# Patient Record
Sex: MALE | Race: WHITE | NOT HISPANIC OR LATINO
[De-identification: names, ages, dates, MRNs, and addresses within clinical notes are randomized per-mention and may not be internally consistent; named-entity substitution may affect disease eponyms.]

---

## 2020-11-24 ENCOUNTER — TRANSCRIPTION ENCOUNTER (OUTPATIENT)
Age: 75
End: 2020-11-24

## 2021-09-21 ENCOUNTER — TRANSCRIPTION ENCOUNTER (OUTPATIENT)
Age: 76
End: 2021-09-21

## 2022-07-04 ENCOUNTER — NON-APPOINTMENT (OUTPATIENT)
Age: 77
End: 2022-07-04

## 2023-05-23 PROBLEM — Z00.00 ENCOUNTER FOR PREVENTIVE HEALTH EXAMINATION: Status: ACTIVE | Noted: 2023-05-23

## 2023-06-19 ENCOUNTER — TRANSCRIPTION ENCOUNTER (OUTPATIENT)
Age: 78
End: 2023-06-19

## 2023-06-19 ENCOUNTER — APPOINTMENT (OUTPATIENT)
Dept: UROLOGY | Facility: CLINIC | Age: 78
End: 2023-06-19
Payer: MEDICARE

## 2023-06-19 VITALS
DIASTOLIC BLOOD PRESSURE: 52 MMHG | SYSTOLIC BLOOD PRESSURE: 89 MMHG | HEART RATE: 65 BPM | HEIGHT: 70.5 IN | TEMPERATURE: 98 F | WEIGHT: 182 LBS | BODY MASS INDEX: 25.76 KG/M2 | OXYGEN SATURATION: 96 %

## 2023-06-19 VITALS — HEART RATE: 63 BPM | DIASTOLIC BLOOD PRESSURE: 56 MMHG | SYSTOLIC BLOOD PRESSURE: 100 MMHG

## 2023-06-19 DIAGNOSIS — N40.0 BENIGN PROSTATIC HYPERPLASIA WITHOUT LOWER URINARY TRACT SYMPMS: ICD-10-CM

## 2023-06-19 DIAGNOSIS — Z80.1 FAMILY HISTORY OF MALIGNANT NEOPLASM OF TRACHEA, BRONCHUS AND LUNG: ICD-10-CM

## 2023-06-19 DIAGNOSIS — Z86.79 PERSONAL HISTORY OF OTHER DISEASES OF THE CIRCULATORY SYSTEM: ICD-10-CM

## 2023-06-19 DIAGNOSIS — Z86.39 PERSONAL HISTORY OF OTHER ENDOCRINE, NUTRITIONAL AND METABOLIC DISEASE: ICD-10-CM

## 2023-06-19 PROCEDURE — 99204 OFFICE O/P NEW MOD 45 MIN: CPT

## 2023-06-20 NOTE — ASSESSMENT
[FreeTextEntry1] : MEENU JIANG, is a 77 year old  with PMH of HTN and LBBB who was referred for fusion biopsy. His most recent MRI (03/27/2023) shows 133 ml prostate with PIRADS 5 lesion measuring 1.5 cm in the right posterolateral apex peripheral zone. PSAD 0.04. He has no hx of prostate biopsy and no FH of prostate cancer. \par \par patient and wife have been explained the RBA of prostate cancer screening and the size of the prostate which if diagnosed would require ADT prior to radiation if there is acutally cancer may adversely affect is QOL.  We will trend PSA and use 10 as a threshold to consider repeat MRI if sooner than 1 year.\par \par Elevated PSA\par -follow up in 6 months with PSA\par -repeat prostate MRI in 1 year (June 2024)\par -can consider prostate Biopsy if additional or suspicious lesions after prostate MRI\par  \par Thank you very much for allowing me to assist in the care of this patient. Should you have any additional questions or concerns please do not hesitate to contact me.\par \par \par Sincerely,\par \par \par Daren Morales D.O.\par Professor of Urology and Radiology\par  of Urology at \par System Director for Prostate Cancer\par [address] [facility], [address]\par Phone: [phone]\par

## 2023-06-20 NOTE — HISTORY OF PRESENT ILLNESS
[FreeTextEntry1] : Dear Dr. LEVENTHAL, IRWIN\par \par Thank you so much for the referral to help care for your patient.\par \par Chief Complaint: Prostate Cancer Consultation\par Date of first visit: 06/19/2023\par \par MEENU JIANG, is a 77 year old  with PMH of HTN and LBBB who presents for Prostate Cancer Consultation. Both him and his wife are avid skiers. He has been following with Dr. Leventhal who referred him for fusion Biopsy due to recent MRI imaging (see below). He is currently not on any urinary medications, no family history of prostate cancer and has never had a prostate biopsy. \par \par LUTS- he has mild LUTS (urgency and nocturia 2-3x) and is currently not on medication. Only bothersome symptom is nocturia 2-3x, but is able to go back to sleep. Denies any fevers, chills, hematuria, dysuria or recent UTI.\par  \par PSA Hx\par 6.39 3/27/23   PSAD 0.04\par 6.6 12/5/22\par 5.6 5/10/22\par \par MRI Hx:\par MRI read 03/27/2023. 133 ml prostate with PIRADS 5 lesion measuring 1.5 cm in the right posterolateral apex peripheral zone. No LAD No EPE, No Bony Lesions.  The images have been reviewed and clinical implications discussed with the patient.\par \par MRI at [place] on 5/11/2022. 147 cc prostate with no suspicious lesions, PIRADS 2.  No LAD No EPE, No Bony Lesions.  The images have been reviewed and clinical implications discussed with the patient.\par \par 06/19/2023\par IPSS 9    QOL 3\par HAKAN 16\par \par Prostate cancer screening: the patient and I spoke at length about prostate cancer screening, its risks and its benefits. The patient has (Age, PSA) 2 risk factors for prostate cancer.  He understands that many men with prostate cancer will die with the disease rather than of it and we also discussed the results large multi-center American and  prostate cancer screening trials. He also understands that PSA in and of itself does not diagnose prostate cancer but only assesses risk to a certain degree. The patient understands that to definitively screen for prostate cancer, a biopsy is required and this procedure has risks, including bleeding, infection, ED and urinary retention. The patient opted to hold on a biopsy for now given that the MRI demonstrated no EPE or LAD.\par  \par The patient denies fevers, chills, nausea and or vomiting and no unexplained weight loss.\par \par All pertinent laboratory, films and physician notes were reviewed.  Questionnaire results were discussed with patient.\par \par I spent 31 minutes of non-face to face time reviewing the patient's records, chart, uploading processing MR images, transferring MR images from PACS to an independent workstation, reviewing images on independent workstation, speaking with their physician and planning their prostate biopsy and preparation of an upcoming visit for 06/19/2023 .  The imaging and planning was highly complex and took this entire time. \par \par \par

## 2023-09-21 ENCOUNTER — NON-APPOINTMENT (OUTPATIENT)
Age: 78
End: 2023-09-21

## 2023-09-21 ENCOUNTER — APPOINTMENT (OUTPATIENT)
Dept: INFECTIOUS DISEASE | Facility: CLINIC | Age: 78
End: 2023-09-21
Payer: MEDICARE

## 2023-09-21 VITALS — TEMPERATURE: 97.9 F | OXYGEN SATURATION: 98 % | HEART RATE: 52 BPM

## 2023-09-21 PROCEDURE — 99214 OFFICE O/P EST MOD 30 MIN: CPT

## 2023-09-21 RX ORDER — DAPTOMYCIN 500 MG/20ML
500 INJECTION, POWDER, LYOPHILIZED, FOR SUSPENSION INTRAVENOUS
Refills: 0 | Status: ACTIVE | COMMUNITY
Start: 2023-09-21

## 2023-10-05 ENCOUNTER — NON-APPOINTMENT (OUTPATIENT)
Age: 78
End: 2023-10-05

## 2023-10-12 ENCOUNTER — APPOINTMENT (OUTPATIENT)
Dept: INFECTIOUS DISEASE | Facility: CLINIC | Age: 78
End: 2023-10-12
Payer: MEDICARE

## 2023-10-12 ENCOUNTER — NON-APPOINTMENT (OUTPATIENT)
Age: 78
End: 2023-10-12

## 2023-10-12 VITALS
SYSTOLIC BLOOD PRESSURE: 98 MMHG | DIASTOLIC BLOOD PRESSURE: 54 MMHG | OXYGEN SATURATION: 97 % | HEART RATE: 79 BPM | TEMPERATURE: 97.9 F

## 2023-10-12 DIAGNOSIS — D69.6 THROMBOCYTOPENIA, UNSPECIFIED: ICD-10-CM

## 2023-10-12 DIAGNOSIS — S92.403A DISPLACED UNSPECIFIED FRACTURE OF UNSPECIFIED GREAT TOE, INITIAL ENCOUNTER FOR CLOSED FRACTURE: ICD-10-CM

## 2023-10-12 DIAGNOSIS — M86.9 OSTEOMYELITIS, UNSPECIFIED: ICD-10-CM

## 2023-10-12 PROCEDURE — 99214 OFFICE O/P EST MOD 30 MIN: CPT

## 2023-10-12 RX ORDER — CEFEPIME HYDROCHLORIDE 2 G/1
2 INJECTION, POWDER, FOR SOLUTION INTRAMUSCULAR; INTRAVENOUS
Refills: 0 | Status: DISCONTINUED | COMMUNITY
Start: 2023-09-21 | End: 2023-10-12

## 2023-11-27 ENCOUNTER — APPOINTMENT (OUTPATIENT)
Dept: UROLOGY | Facility: CLINIC | Age: 78
End: 2023-11-27
Payer: MEDICARE

## 2023-11-27 VITALS
DIASTOLIC BLOOD PRESSURE: 78 MMHG | SYSTOLIC BLOOD PRESSURE: 122 MMHG | OXYGEN SATURATION: 98 % | TEMPERATURE: 98.7 F | HEART RATE: 88 BPM

## 2023-11-27 DIAGNOSIS — N13.8 BENIGN PROSTATIC HYPERPLASIA WITH LOWER URINARY TRACT SYMPMS: ICD-10-CM

## 2023-11-27 DIAGNOSIS — N40.1 BENIGN PROSTATIC HYPERPLASIA WITH LOWER URINARY TRACT SYMPMS: ICD-10-CM

## 2023-11-27 DIAGNOSIS — R97.20 ELEVATED PROSTATE, SPECIFIC ANTIGEN [PSA]: ICD-10-CM

## 2023-11-27 PROCEDURE — 99213 OFFICE O/P EST LOW 20 MIN: CPT

## 2023-11-27 RX ORDER — ALFUZOSIN HYDROCHLORIDE 10 MG/1
10 TABLET, EXTENDED RELEASE ORAL
Qty: 90 | Refills: 3 | Status: ACTIVE | COMMUNITY
Start: 2023-11-27 | End: 1900-01-01

## 2024-02-27 RX ORDER — ALFUZOSIN HYDROCHLORIDE 10 MG/1
10 TABLET, EXTENDED RELEASE ORAL
Qty: 90 | Refills: 2 | Status: ACTIVE | COMMUNITY
Start: 2024-02-27 | End: 1900-01-01

## 2024-03-29 RX ORDER — ALFUZOSIN HYDROCHLORIDE 10 MG/1
10 TABLET, EXTENDED RELEASE ORAL
Qty: 90 | Refills: 2 | Status: ACTIVE | COMMUNITY
Start: 2024-03-29 | End: 1900-01-01

## 2024-05-21 ENCOUNTER — OFFICE (OUTPATIENT)
Dept: URBAN - METROPOLITAN AREA CLINIC 38 | Facility: CLINIC | Age: 79
Setting detail: OPHTHALMOLOGY
End: 2024-05-21
Payer: MEDICARE

## 2024-05-21 DIAGNOSIS — H01.001: ICD-10-CM

## 2024-05-21 DIAGNOSIS — H01.004: ICD-10-CM

## 2024-05-21 DIAGNOSIS — H11.31: ICD-10-CM

## 2024-05-21 PROBLEM — H25.13 CATARACT; BOTH EYES: Status: ACTIVE | Noted: 2024-05-21

## 2024-05-21 PROCEDURE — 99203 OFFICE O/P NEW LOW 30 MIN: CPT | Performed by: OPHTHALMOLOGY

## 2024-05-21 ASSESSMENT — CONFRONTATIONAL VISUAL FIELD TEST (CVF)
OS_FINDINGS: FULL
OD_FINDINGS: FULL

## 2024-05-21 ASSESSMENT — LID EXAM ASSESSMENTS
OD_BLEPHARITIS: RUL T 1+
OS_BLEPHARITIS: LUL T 1+

## 2024-05-23 ENCOUNTER — APPOINTMENT (OUTPATIENT)
Dept: MRI IMAGING | Facility: CLINIC | Age: 79
End: 2024-05-23

## 2024-06-03 ENCOUNTER — NON-APPOINTMENT (OUTPATIENT)
Age: 79
End: 2024-06-03

## 2024-06-11 ENCOUNTER — TRANSCRIPTION ENCOUNTER (OUTPATIENT)
Age: 79
End: 2024-06-11

## 2024-06-24 ENCOUNTER — APPOINTMENT (OUTPATIENT)
Dept: UROLOGY | Facility: CLINIC | Age: 79
End: 2024-06-24

## 2024-07-25 ENCOUNTER — RESULT REVIEW (OUTPATIENT)
Age: 79
End: 2024-07-25

## 2024-07-25 ENCOUNTER — APPOINTMENT (OUTPATIENT)
Dept: MRI IMAGING | Facility: CLINIC | Age: 79
End: 2024-07-25
Payer: MEDICARE

## 2024-07-25 PROCEDURE — 76498P: CUSTOM | Mod: 26,MH

## 2024-07-25 PROCEDURE — 72197 MRI PELVIS W/O & W/DYE: CPT | Mod: 26,MH

## 2024-07-31 ENCOUNTER — TRANSCRIPTION ENCOUNTER (OUTPATIENT)
Age: 79
End: 2024-07-31

## 2024-08-21 ENCOUNTER — APPOINTMENT (OUTPATIENT)
Dept: UROLOGY | Facility: CLINIC | Age: 79
End: 2024-08-21
Payer: MEDICARE

## 2024-08-21 DIAGNOSIS — N13.8 BENIGN PROSTATIC HYPERPLASIA WITH LOWER URINARY TRACT SYMPMS: ICD-10-CM

## 2024-08-21 DIAGNOSIS — N40.1 BENIGN PROSTATIC HYPERPLASIA WITH LOWER URINARY TRACT SYMPMS: ICD-10-CM

## 2024-08-21 PROCEDURE — 99213 OFFICE O/P EST LOW 20 MIN: CPT

## 2024-08-21 RX ORDER — FAMOTIDINE 10 MG/1
TABLET, FILM COATED ORAL
Refills: 0 | Status: ACTIVE | COMMUNITY

## 2024-08-21 RX ORDER — INDOMETHACIN 50 MG/1
CAPSULE ORAL
Refills: 0 | Status: ACTIVE | COMMUNITY

## 2024-08-21 RX ORDER — DOCUSATE SODIUM 100 MG/1
100 CAPSULE ORAL
Refills: 0 | Status: ACTIVE | COMMUNITY

## 2024-08-21 RX ORDER — SILDENAFIL 20 MG/1
TABLET ORAL
Refills: 0 | Status: ACTIVE | COMMUNITY

## 2024-08-21 RX ORDER — SIMVASTATIN 80 MG/1
TABLET, FILM COATED ORAL
Refills: 0 | Status: ACTIVE | COMMUNITY

## 2024-08-21 RX ORDER — CARVEDILOL 25 MG/1
25 TABLET, FILM COATED ORAL
Refills: 0 | Status: ACTIVE | COMMUNITY

## 2024-08-21 NOTE — HISTORY OF PRESENT ILLNESS
[FreeTextEntry1] : Chief Complaint: Prostate Cancer Consultation  Date of first visit: 06/19/2023  MEENU JIANG, is a 78 year old  with PMH of HTN and LBBB who presents for Prostate Cancer Consultation. Both him and his wife are avid skiers. He has been following with Dr. Leventhal who referred him for fusion Biopsy due to recent MRI imaging (see below). He is currently not on any urinary medications, no family history of prostate cancer and has never had a prostate biopsy.  LUTS- he has mild LUTS (urgency and nocturia 2-3x) and is currently not on medication. Only bothersome symptom is nocturia 2-3x, but is able to go back to sleep. Denies any fevers, chills, hematuria, dysuria or recent UTI.  Now notes worsening LUTS in interval. States that mostly at night when he experiences symptoms. Notes that small volume voids. States that in ER he did require straight catheterization (~800cc), but did not require indwelling. States that he also had an episode of retention after long flight to [place].  He recently had an accident (9/2/23) with left toe laceration to his big toe, placed on antibiotics c/b cellulitis, admitted to [facility] for IV Abx (3-days), discharged w/ PICC and Cefepime and Daptomycin. On interval checkup found to be pan cytopenic scheduled for BM biopsy---> diagnosed with MDS  [place] [facility] [place]   PSA Hx 7.7  04/24/2024 7.49 11/21/23 6.39 3/27/23 PSAD 0.04 6.6 12/5/22 5.6 5/10/22  MRI Hx: MRI at [facility] on 07/25/2024.  159 cc prostate with PIRADS 4 lesion #1 measuring 11 x 8 x 7 mm in the Right, posterior lateral (PZpl), apex, peripheral zone. Lesion #2 measuring 8 x 6 x 8 mm in the Right, posterior lateral (PZpl), midgland, peripheral zone. No LAD No EPE, No Bony Lesions.  The images have been reviewed and clinical implications discussed with the patient.  MRI at [place] on 05/14/2023. 133 cc prostate with PIRADS 5 lesion #1 measuring 1.5 mm in the Right, posterolateral apex peripheral zone. No LAD No EPE, No Bony Lesions. The images have been reviewed and clinical implications discussed with the patient.  MRI read 03/27/2023. 133 ml prostate with PIRADS 5 lesion measuring 1.5 cm in the right posterolateral apex peripheral zone. No LAD No EPE, No Bony Lesions. The images have been reviewed and clinical implications discussed with the patient.  MRI at [place] on 5/11/2022. 147 cc prostate with no suspicious lesions, PIRADS 2. No LAD No EPE, No Bony Lesions. The images have been reviewed and clinical implications discussed with the patient.  08/21/2024 IPSS 12      QOL 3 HAKAN  17 PVR: 60cc  11/27/2023 IPSS 18 QOL 3 HAKAN 17 PVR 7cc (11/27/23)  06/19/2023 IPSS 9 QOL 3 HAKAN 16  Prostate cancer screening: the patient and I spoke at length about prostate cancer screening, its risks and its benefits. The patient has (Age, PSA) 2 risk factors for prostate cancer. He understands that many men with prostate cancer will die with the disease rather than of it and we also discussed the results large multi-center American and  prostate cancer screening trials. He also understands that PSA in and of itself does not diagnose prostate cancer but only assesses risk to a certain degree. The patient understands that to definitively screen for prostate cancer, a biopsy is required and this procedure has risks, including bleeding, infection, ED and urinary retention. The patient opted to hold on a biopsy for now given that the MRI demonstrated no EPE or LAD.  The patient denies fevers, chills, nausea and or vomiting and no unexplained weight loss.  All pertinent laboratory, films and physician notes were reviewed. Questionnaire results were discussed with patient.

## 2024-08-21 NOTE — HISTORY OF PRESENT ILLNESS
[FreeTextEntry1] : Chief Complaint: Prostate Cancer Consultation  Date of first visit: 06/19/2023  MEENU JIANG, is a 78 year old  with PMH of HTN and LBBB who presents for Prostate Cancer Consultation. Both him and his wife are avid skiers. He has been following with Dr. Leventhal who referred him for fusion Biopsy due to recent MRI imaging (see below). He is currently not on any urinary medications, no family history of prostate cancer and has never had a prostate biopsy.  LUTS- he has mild LUTS (urgency and nocturia 2-3x) and is currently not on medication. Only bothersome symptom is nocturia 2-3x, but is able to go back to sleep. Denies any fevers, chills, hematuria, dysuria or recent UTI.  Now notes worsening LUTS in interval. States that mostly at night when he experiences symptoms. Notes that small volume voids. States that in ER he did require straight catheterization (~800cc), but did not require indwelling. States that he also had an episode of retention after long flight to [place].  He recently had an accident (9/2/23) with left toe laceration to his big toe, placed on antibiotics c/b cellulitis, admitted to [facility] for IV Abx (3-days), discharged w/ PICC and Cefepime and Daptomycin. On interval checkup found to be pan cytopenic scheduled for BM biopsy---> diagnosed with MDS  Patient on [place] in [facility] [place]DS.   PSA Hx 7.7  04/24/2024 7.49 11/21/23 6.39 3/27/23 PSAD 0.04 6.6 12/5/22 5.6 5/10/22  MRI Hx: MRI at [facility] on 07/25/2024.  159 cc prostate with PIRADS 4 lesion #1 measuring 11 x 8 x 7 mm in the Right, posterior lateral (PZpl), apex, peripheral zone. Lesion #2 measuring 8 x 6 x 8 mm in the Right, posterior lateral (PZpl), midgland, peripheral zone. No LAD No EPE, No Bony Lesions.  The images have been reviewed and clinical implications discussed with the patient.  MRI at [place] on 05/14/2023. 133 cc prostate with PIRADS 5 lesion #1 measuring 1.5 mm in the Right, posterolateral apex peripheral zone. No LAD No EPE, No Bony Lesions. The images have been reviewed and clinical implications discussed with the patient.  MRI read 03/27/2023. 133 ml prostate with PIRADS 5 lesion measuring 1.5 cm in the right posterolateral apex peripheral zone. No LAD No EPE, No Bony Lesions. The images have been reviewed and clinical implications discussed with the patient.  MRI at [place] on 5/11/2022. 147 cc prostate with no suspicious lesions, PIRADS 2. No LAD No EPE, No Bony Lesions. The images have been reviewed and clinical implications discussed with the patient.  08/21/2024 IPSS 12      QOL 3 HAKAN  17 PVR: 60cc  11/27/2023 IPSS 18 QOL 3 AHKAN 17 PVR 7cc (11/27/23)  06/19/2023 IPSS 9 QOL 3 HAKAN 16  Prostate cancer screening: the patient and I spoke at length about prostate cancer screening, its risks and its benefits. The patient has (Age, PSA) 2 risk factors for prostate cancer. He understands that many men with prostate cancer will die with the disease rather than of it and we also discussed the results large multi-center American and  prostate cancer screening trials. He also understands that PSA in and of itself does not diagnose prostate cancer but only assesses risk to a certain degree. The patient understands that to definitively screen for prostate cancer, a biopsy is required and this procedure has risks, including bleeding, infection, ED and urinary retention. The patient opted to hold on a biopsy for now given that the MRI demonstrated no EPE or LAD.  The patient denies fevers, chills, nausea and or vomiting and no unexplained weight loss.  All pertinent laboratory, films and physician notes were reviewed. Questionnaire results were discussed with patient.

## 2024-08-21 NOTE — HISTORY OF PRESENT ILLNESS
[FreeTextEntry1] : Chief Complaint: Prostate Cancer Consultation  Date of first visit: 06/19/2023  MEENU JIANG, is a 78 year old  with PMH of HTN and LBBB who presents for Prostate Cancer Consultation. Both him and his wife are avid skiers. He has been following with Dr. Leventhal who referred him for fusion Biopsy due to recent MRI imaging (see below). He is currently not on any urinary medications, no family history of prostate cancer and has never had a prostate biopsy.  LUTS- he has mild LUTS (urgency and nocturia 2-3x) and is currently not on medication. Only bothersome symptom is nocturia 2-3x, but is able to go back to sleep. Denies any fevers, chills, hematuria, dysuria or recent UTI.  Now notes worsening LUTS in interval. States that mostly at night when he experiences symptoms. Notes that small volume voids. States that in ER he did require straight catheterization (~800cc), but did not require indwelling. States that he also had an episode of retention after long flight to [place].  He recently had an accident (9/2/23) with left toe laceration to his big toe, placed on antibiotics c/b cellulitis, admitted to [facility] for IV Abx (3-days), discharged w/ PICC and Cefepime and Daptomycin. On interval checkup found to be pan cytopenic scheduled for BM biopsy---> diagnosed with MDS  [place] [facility]   PSA Hx 7.7  04/24/2024 7.49 11/21/23 6.39 3/27/23 PSAD 0.04 6.6 12/5/22 5.6 5/10/22  MRI Hx: MRI at [facility] on 07/25/2024.  159 cc prostate with PIRADS 4 lesion #1 measuring 11 x 8 x 7 mm in the Right, posterior lateral (PZpl), apex, peripheral zone. Lesion #2 measuring 8 x 6 x 8 mm in the Right, posterior lateral (PZpl), midgland, peripheral zone. No LAD No EPE, No Bony Lesions.  The images have been reviewed and clinical implications discussed with the patient.  MRI at [place] on 05/14/2023. 133 cc prostate with PIRADS 5 lesion #1 measuring 1.5 mm in the Right, posterolateral apex peripheral zone. No LAD No EPE, No Bony Lesions. The images have been reviewed and clinical implications discussed with the patient.  MRI read 03/27/2023. 133 ml prostate with PIRADS 5 lesion measuring 1.5 cm in the right posterolateral apex peripheral zone. No LAD No EPE, No Bony Lesions. The images have been reviewed and clinical implications discussed with the patient.  MRI at [place] on 5/11/2022. 147 cc prostate with no suspicious lesions, PIRADS 2. No LAD No EPE, No Bony Lesions. The images have been reviewed and clinical implications discussed with the patient.  08/21/2024 IPSS 12      QOL 3 HAKAN  17 PVR: 60cc  11/27/2023 IPSS 18 QOL 3 HAKAN 17 PVR 7cc (11/27/23)  06/19/2023 IPSS 9 QOL 3 HAKAN 16  Prostate cancer screening: the patient and I spoke at length about prostate cancer screening, its risks and its benefits. The patient has (Age, PSA) 2 risk factors for prostate cancer. He understands that many men with prostate cancer will die with the disease rather than of it and we also discussed the results large multi-center American and  prostate cancer screening trials. He also understands that PSA in and of itself does not diagnose prostate cancer but only assesses risk to a certain degree. The patient understands that to definitively screen for prostate cancer, a biopsy is required and this procedure has risks, including bleeding, infection, ED and urinary retention. The patient opted to hold on a biopsy for now given that the MRI demonstrated no EPE or LAD.  The patient denies fevers, chills, nausea and or vomiting and no unexplained weight loss.  All pertinent laboratory, films and physician notes were reviewed. Questionnaire results were discussed with patient.

## 2024-08-21 NOTE — PHYSICAL EXAM
[General Appearance - In No Acute Distress] : no acute distress [] : no respiratory distress [Abdomen Soft] : soft [Abdomen Tenderness] : non-tender [Costovertebral Angle Tenderness] : no ~M costovertebral angle tenderness [Oriented To Time, Place, And Person] : oriented to person, place, and time

## 2024-08-21 NOTE — ASSESSMENT
[FreeTextEntry1] : 78 year old  with PMH of HTN and LBBB who was referred for fusion biopsy. His most recent MRI (03/27/2023) shows 133 ml prostate with PIRADS 5 lesion measuring 1.5 cm in the right posterolateral apex peripheral zone. PSAD 0.04. He has no hx of prostate biopsy and no FH of prostate cancer.  patient and wife have been explained the RBA of prostate cancer screening and the size of the prostate which if diagnosed would require ADT prior to radiation if there is actually cancer may adversely affect is QOL. We will trend PSA and use 10 as a threshold to consider repeat MRI if sooner than 1 year.  LUTS/BPH - IPSS 18---> 12-15, patient happy with improvement  - has been taking Uroxatral; no side effects  - PVR 8/21/24 50 cc  - If worsening or no relief, can consider PAE in the future - continue alfuzosin   Elevated PSA - Most recent 7.49, PSAD still wnl; will plan to monitor and repeat MRI in 1 year (July 2024) - follow up in 6 months with PSA -can consider prostate Biopsy if additional or suspicious lesions after prostate MRI  Being [place] in [facility] [place], plan for bone marrow transplant 10-11/2024  Thank you very much for allowing me to assist in the care of this patient. Please do not hesitate to contact me with any additional questions or concerns.      Sincerely,     Daren Morales D.O. Professor of Urology and Radiology  of Urology at [facility] Director for Prostate Cancer [address] [facility], [facility] Phone: [phone]

## 2024-08-21 NOTE — ASSESSMENT
[FreeTextEntry1] : 78 year old  with PMH of HTN and LBBB who was referred for fusion biopsy. His most recent MRI (03/27/2023) shows 133 ml prostate with PIRADS 5 lesion measuring 1.5 cm in the right posterolateral apex peripheral zone. PSAD 0.04. He has no hx of prostate biopsy and no FH of prostate cancer.  patient and wife have been explained the RBA of prostate cancer screening and the size of the prostate which if diagnosed would require ADT prior to radiation if there is actually cancer may adversely affect is QOL. We will trend PSA and use 10 as a threshold to consider repeat MRI if sooner than 1 year.  LUTS/BPH - IPSS 18---> 12-15, patient happy with improvement  - has been taking Uroxatral; no side effects  - PVR 8/21/24 50 cc  - If worsening or no relief, can consider PAE in the future - continue alfuzosin   Elevated PSA - Most recent 7.49, PSAD still wnl; will plan to monitor and repeat MRI in 1 year (July 2024) - follow up in 6 months with PSA -can consider prostate Biopsy if additional or suspicious lesions after prostate MRI  Being [place] [facility] [place], plan for bone marrow transplant 10-11/2024  Thank you very much for allowing me to assist in the care of this patient. Please do not hesitate to contact me with any additional questions or concerns.      Sincerely,     Daren Morales D.O. Professor of Urology and Radiology  of Urology at [facility] Director for Prostate Cancer [address] [facility], [facility] Phone: [phone]

## 2024-08-21 NOTE — ADDENDUM
[FreeTextEntry1] :   I, Dr. Morales, personally performed the evaluation and management (E/M) services for this established patient who presents today with (a) new problem(s)/exacerbation of (an) existing condition(s).  That E/M includes conducting the examination, assessing all new/exacerbated conditions, and establishing a new plan of care.  Today, my ACP, Vaishnavi Florez, ANP-BC, was here to observe my evaluation and management services for this new problem/exacerbated condition to be followed going forward.

## 2024-08-21 NOTE — ASSESSMENT
[FreeTextEntry1] : 78 year old  with PMH of HTN and LBBB who was referred for fusion biopsy. His most recent MRI (03/27/2023) shows 133 ml prostate with PIRADS 5 lesion measuring 1.5 cm in the right posterolateral apex peripheral zone. PSAD 0.04. He has no hx of prostate biopsy and no FH of prostate cancer.  patient and wife have been explained the RBA of prostate cancer screening and the size of the prostate which if diagnosed would require ADT prior to radiation if there is actually cancer may adversely affect is QOL. We will trend PSA and use 10 as a threshold to consider repeat MRI if sooner than 1 year.  LUTS/BPH - IPSS 18---> 12-15, patient happy with improvement  - has been taking Uroxatral; no side effects  - PVR 8/21/24 50 cc  - If worsening or no relief, can consider PAE in the future - continue alfuzosin   Elevated PSA - Most recent 7.49, PSAD still wnl; will plan to monitor and repeat MRI in 1 year (July 2024) - follow up in 6 months with PSA -can consider prostate Biopsy if additional or suspicious lesions after prostate MRI  [place] [facility] [place], plan for bone marrow transplant 10-11/2024  Thank you very much for allowing me to assist in the care of this patient. Please do not hesitate to contact me with any additional questions or concerns.      Sincerely,     Daren Morales D.O. Professor of Urology and Radiology  of Urology at [facility] Director for Prostate Cancer [address] [facility], [facility] Phone: [phone]

## 2024-10-14 ENCOUNTER — OFFICE (OUTPATIENT)
Dept: URBAN - METROPOLITAN AREA CLINIC 38 | Facility: CLINIC | Age: 79
Setting detail: OPHTHALMOLOGY
End: 2024-10-14
Payer: MEDICARE

## 2024-10-14 DIAGNOSIS — H47.022: ICD-10-CM

## 2024-10-14 DIAGNOSIS — H43.813: ICD-10-CM

## 2024-10-14 DIAGNOSIS — H25.13: ICD-10-CM

## 2024-10-14 PROCEDURE — 99213 OFFICE O/P EST LOW 20 MIN: CPT | Performed by: OPHTHALMOLOGY

## 2024-10-14 ASSESSMENT — REFRACTION_MANIFEST
OD_VA1: 20/20-2
OD_AXIS: 100
OS_CYLINDER: -1.25
OS_VA2: 20/20(J1+)
OS_ADD: +3.00
OS_ADD: +2.50
OD_ADD: +3.00
OD_VA2: 20/20(J1+)
OD_CYLINDER: -1.75
OS_CYLINDER: -1.25
OD_SPHERE: -2.75
OD_ADD: +2.50
OU_VA: 20/25+2
OD_AXIS: 100
OU_VA: 20/25+2
OS_VA1: 20/25-2
OD_CYLINDER: -1.75
OS_VA1: 20/25-2
OS_AXIS: 080
OS_VA2: 20/20(J1+)
OS_SPHERE: -1.75
OD_VA1: 20/20-2
OD_VA2: 20/20(J1+)
OD_SPHERE: -2.75
OS_AXIS: 080
OS_SPHERE: -1.75

## 2024-10-14 ASSESSMENT — TONOMETRY
OD_IOP_MMHG: 14
OS_IOP_MMHG: 12

## 2024-10-14 ASSESSMENT — REFRACTION_CURRENTRX
OD_CYLINDER: -0.75
OD_ADD: +2.25
OS_CYLINDER: -0.75
OD_OVR_VA: 20/
OD_SPHERE: -2.00
OS_VPRISM_DIRECTION: PROGS
OS_ADD: +2.25
OD_VPRISM_DIRECTION: PROGS
OS_SPHERE: -2.00
OS_AXIS: 070
OS_OVR_VA: 20/
OD_AXIS: 090

## 2024-10-14 ASSESSMENT — REFRACTION_AUTOREFRACTION
OS_AXIS: 081
OS_SPHERE: -1.75
OD_AXIS: 102
OS_CYLINDER: -1.75
OD_SPHERE: -2.75
OD_CYLINDER: -1.75

## 2024-10-14 ASSESSMENT — KERATOMETRY
OD_K2POWER_DIOPTERS: 42.50
OS_K1POWER_DIOPTERS: 41.75
OD_K1POWER_DIOPTERS: 41.50
OS_AXISANGLE_DEGREES: 006
OS_K2POWER_DIOPTERS: 42.50
OD_AXISANGLE_DEGREES: 019
METHOD_AUTO_MANUAL: AUTO

## 2024-10-14 ASSESSMENT — CONFRONTATIONAL VISUAL FIELD TEST (CVF)
OD_FINDINGS: FULL
OS_FINDINGS: FULL

## 2024-10-14 ASSESSMENT — VISUAL ACUITY
OS_BCVA: 20/25+
OD_BCVA: 20/40+2

## 2024-12-09 ENCOUNTER — NON-APPOINTMENT (OUTPATIENT)
Age: 79
End: 2024-12-09

## 2024-12-23 ENCOUNTER — RX RENEWAL (OUTPATIENT)
Age: 79
End: 2024-12-23

## 2025-02-24 ENCOUNTER — NON-APPOINTMENT (OUTPATIENT)
Age: 80
End: 2025-02-24

## 2025-03-12 ENCOUNTER — APPOINTMENT (OUTPATIENT)
Dept: UROLOGY | Facility: CLINIC | Age: 80
End: 2025-03-12

## 2025-03-26 ENCOUNTER — TRANSCRIPTION ENCOUNTER (OUTPATIENT)
Age: 80
End: 2025-03-26

## 2025-03-27 ENCOUNTER — TRANSCRIPTION ENCOUNTER (OUTPATIENT)
Age: 80
End: 2025-03-27

## 2025-03-28 ENCOUNTER — APPOINTMENT (OUTPATIENT)
Dept: UROLOGY | Facility: CLINIC | Age: 80
End: 2025-03-28
Payer: MEDICARE

## 2025-03-28 DIAGNOSIS — R97.20 ELEVATED PROSTATE, SPECIFIC ANTIGEN [PSA]: ICD-10-CM

## 2025-03-28 DIAGNOSIS — N40.1 BENIGN PROSTATIC HYPERPLASIA WITH LOWER URINARY TRACT SYMPMS: ICD-10-CM

## 2025-03-28 DIAGNOSIS — N13.8 BENIGN PROSTATIC HYPERPLASIA WITH LOWER URINARY TRACT SYMPMS: ICD-10-CM

## 2025-03-28 PROCEDURE — 99213 OFFICE O/P EST LOW 20 MIN: CPT | Mod: 2W

## 2025-05-28 ENCOUNTER — RX ONLY (RX ONLY)
Age: 80
End: 2025-05-28

## 2025-05-28 ENCOUNTER — OFFICE (OUTPATIENT)
Dept: URBAN - METROPOLITAN AREA CLINIC 38 | Facility: CLINIC | Age: 80
Setting detail: OPHTHALMOLOGY
End: 2025-05-28
Payer: MEDICARE

## 2025-05-28 DIAGNOSIS — H00.12: ICD-10-CM

## 2025-05-28 DIAGNOSIS — H47.022: ICD-10-CM

## 2025-05-28 DIAGNOSIS — H25.13: ICD-10-CM

## 2025-05-28 DIAGNOSIS — H43.813: ICD-10-CM

## 2025-05-28 PROCEDURE — 92014 COMPRE OPH EXAM EST PT 1/>: CPT | Performed by: OPHTHALMOLOGY

## 2025-05-28 ASSESSMENT — REFRACTION_MANIFEST
OD_SPHERE: -2.75
OS_VA1: 20/25-2
OS_CYLINDER: -0.50
OU_VA: 20/20
OD_ADD: +2.50
OS_SPHERE: -1.50
OS_AXIS: 070
OD_SPHERE: -2.75
OD_CYLINDER: -1.75
OD_AXIS: 100
OS_VA1: 20/20
OS_ADD: +3.00
OS_ADD: +2.50
OD_VA2: 20/20(J1+)
OS_SPHERE: -1.75
OS_AXIS: 080
OD_VA1: 20/20-2
OD_AXIS: 900
OS_CYLINDER: -1.25
OD_CYLINDER: -0.75
OS_VA2: 20/20(J1+)
OS_VA2: 20/20(J1+)
OU_VA: 20/25+2
OD_VA1: 20/30
OD_VA2: 20/20(J1+)
OD_ADD: +3.00

## 2025-05-28 ASSESSMENT — REFRACTION_CURRENTRX
OD_OVR_VA: 20/
OD_VPRISM_DIRECTION: PROGS
OS_VPRISM_DIRECTION: PROGS
OD_AXIS: 090
OS_AXIS: 070
OD_ADD: +2.25
OD_SPHERE: --2.75
OS_CYLINDER: -0.75
OD_CYLINDER: -0.75
OS_OVR_VA: 20/
OS_SPHERE: -2.00
OS_ADD: +2.25

## 2025-05-28 ASSESSMENT — VISUAL ACUITY
OD_BCVA: 20/25
OS_BCVA: 20/50

## 2025-05-28 ASSESSMENT — KERATOMETRY
OD_K1POWER_DIOPTERS: 41.50
OD_AXISANGLE_DEGREES: 007
METHOD_AUTO_MANUAL: AUTO
OS_AXISANGLE_DEGREES: 165
OS_K1POWER_DIOPTERS: 42.00
OS_K2POWER_DIOPTERS: 42.50
OD_K2POWER_DIOPTERS: 42.25

## 2025-05-28 ASSESSMENT — CONFRONTATIONAL VISUAL FIELD TEST (CVF)
OD_FINDINGS: FULL
OS_FINDINGS: FULL

## 2025-05-28 ASSESSMENT — REFRACTION_AUTOREFRACTION
OS_AXIS: 077
OD_CYLINDER: -2.25
OS_CYLINDER: -1.75
OD_AXIS: 094
OD_SPHERE: -2.50
OS_SPHERE: -1.25

## 2025-05-28 ASSESSMENT — TONOMETRY
OD_IOP_MMHG: 17
OS_IOP_MMHG: 17

## 2025-06-03 ENCOUNTER — TRANSCRIPTION ENCOUNTER (OUTPATIENT)
Age: 80
End: 2025-06-03

## 2025-06-03 DIAGNOSIS — N52.9 MALE ERECTILE DYSFUNCTION, UNSPECIFIED: ICD-10-CM

## 2025-06-03 RX ORDER — SILDENAFIL 100 MG/1
100 TABLET, FILM COATED ORAL
Qty: 90 | Refills: 3 | Status: ACTIVE | COMMUNITY
Start: 2025-06-03 | End: 1900-01-01

## 2025-06-24 ENCOUNTER — OFFICE (OUTPATIENT)
Dept: URBAN - METROPOLITAN AREA CLINIC 38 | Facility: CLINIC | Age: 80
Setting detail: OPHTHALMOLOGY
End: 2025-06-24
Payer: MEDICARE

## 2025-06-24 DIAGNOSIS — H25.13: ICD-10-CM

## 2025-06-24 PROCEDURE — 99213 OFFICE O/P EST LOW 20 MIN: CPT | Performed by: OPHTHALMOLOGY

## 2025-06-24 ASSESSMENT — REFRACTION_MANIFEST
OD_AXIS: 100
OS_VA1: 20/25-2
OD_SPHERE: -2.75
OS_ADD: +3.00
OU_VA: 20/25+2
OS_VA1: 20/20-
OS_VA2: 20/20(J1+)
OU_VA: 20/20-2
OD_VA2: 20/20(J1+)
OD_AXIS: 100
OD_ADD: +3.00
OS_AXIS: 080
OD_VA2: 20/20(J1+)
OD_ADD: +3.00
OD_CYLINDER: -1.25
OS_AXIS: 080
OS_SPHERE: -1.75
OS_ADD: +3.00
OD_VA1: 20/20-2
OS_CYLINDER: -1.25
OS_VA2: 20/20(J1+)
OS_CYLINDER: -1.25
OD_SPHERE: -2.25
OD_VA1: 20/40-
OD_CYLINDER: -1.75
OS_SPHERE: -1.75

## 2025-06-24 ASSESSMENT — REFRACTION_CURRENTRX
OD_ADD: +2.25
OD_CYLINDER: -0.75
OD_SPHERE: --2.75
OS_ADD: +2.25
OD_OVR_VA: 20/
OS_OVR_VA: 20/
OD_AXIS: 090
OS_AXIS: 070
OS_SPHERE: -2.00
OS_VPRISM_DIRECTION: PROGS
OD_VPRISM_DIRECTION: PROGS
OS_CYLINDER: -0.75

## 2025-06-24 ASSESSMENT — KERATOMETRY
OS_K1POWER_DIOPTERS: 41.75
METHOD_AUTO_MANUAL: AUTO
OD_K1POWER_DIOPTERS: 41.00
OS_AXISANGLE_DEGREES: 170
OD_K2POWER_DIOPTERS: 42.50
OS_K2POWER_DIOPTERS: 42.50
OD_AXISANGLE_DEGREES: 008

## 2025-06-24 ASSESSMENT — TONOMETRY
OD_IOP_MMHG: 16
OS_IOP_MMHG: 12

## 2025-06-24 ASSESSMENT — REFRACTION_AUTOREFRACTION
OD_CYLINDER: -2.25
OS_SPHERE: -1.25
OS_AXIS: 082
OS_CYLINDER: -2.00
OD_SPHERE: -2.25
OD_AXIS: 098

## 2025-06-24 ASSESSMENT — VISUAL ACUITY
OD_BCVA: 20/25
OS_BCVA: 20/40

## 2025-06-24 ASSESSMENT — CONFRONTATIONAL VISUAL FIELD TEST (CVF)
OD_FINDINGS: FULL
OS_FINDINGS: FULL

## 2025-07-24 ENCOUNTER — RESULT REVIEW (OUTPATIENT)
Age: 80
End: 2025-07-24

## 2025-07-24 ENCOUNTER — OUTPATIENT (OUTPATIENT)
Dept: OUTPATIENT SERVICES | Facility: HOSPITAL | Age: 80
LOS: 1 days | End: 2025-07-24
Payer: MEDICARE

## 2025-07-24 ENCOUNTER — APPOINTMENT (OUTPATIENT)
Dept: MRI IMAGING | Facility: CLINIC | Age: 80
End: 2025-07-24
Payer: MEDICARE

## 2025-07-24 DIAGNOSIS — R97.20 ELEVATED PROSTATE SPECIFIC ANTIGEN [PSA]: ICD-10-CM

## 2025-07-24 PROCEDURE — 76498P: CUSTOM | Mod: 26

## 2025-07-24 PROCEDURE — 72197 MRI PELVIS W/O & W/DYE: CPT | Mod: 26

## 2025-07-24 PROCEDURE — A9585: CPT

## 2025-07-24 PROCEDURE — 72197 MRI PELVIS W/O & W/DYE: CPT

## 2025-07-24 PROCEDURE — 76498 UNLISTED MR PROCEDURE: CPT

## 2025-07-29 ENCOUNTER — OFFICE (OUTPATIENT)
Dept: URBAN - METROPOLITAN AREA CLINIC 8 | Facility: CLINIC | Age: 80
Setting detail: OPHTHALMOLOGY
End: 2025-07-29
Payer: MEDICARE

## 2025-07-29 DIAGNOSIS — H52.4: ICD-10-CM

## 2025-07-29 PROBLEM — H52.7 REFRACTIVE ERROR: Status: ACTIVE | Noted: 2025-07-29

## 2025-07-29 PROCEDURE — 92015 DETERMINE REFRACTIVE STATE: CPT | Performed by: OPHTHALMOLOGY

## 2025-07-29 ASSESSMENT — REFRACTION_MANIFEST
OD_VA2: 20/20(J1+)
OU_VA: 20/20-2
OS_ADD: +3.00
OD_VA1: 20/40-
OD_ADD: +3.00
OD_VA1: 20/40-
OS_VA2: 20/20(J1+)
OD_AXIS: 100
OD_ADD: +3.00
OS_AXIS: 080
OS_AXIS: 080
OS_VA1: 20/20-
OS_SPHERE: -1.75
OS_VA1: 20/20-
OD_CYLINDER: -1.25
OD_VA2: 20/20(J1+)
OS_VA2: 20/20(J1+)
OD_SPHERE: -2.25
OS_CYLINDER: -1.25
OD_CYLINDER: -1.25
OS_ADD: +3.00
OU_VA: 20/20-2
OD_AXIS: 100
OS_SPHERE: -1.75
OD_SPHERE: -2.25
OS_CYLINDER: -1.25

## 2025-07-29 ASSESSMENT — KERATOMETRY
OD_K1POWER_DIOPTERS: 41.00
OS_AXISANGLE_DEGREES: 170
METHOD_AUTO_MANUAL: AUTO
OD_AXISANGLE_DEGREES: 008
OS_K2POWER_DIOPTERS: 42.50
OS_K1POWER_DIOPTERS: 41.75
OD_K2POWER_DIOPTERS: 42.50

## 2025-07-29 ASSESSMENT — REFRACTION_CURRENTRX
OS_VPRISM_DIRECTION: PROGS
OS_OVR_VA: 20/
OS_ADD: +2.25
OD_AXIS: 090
OS_AXIS: 070
OD_CYLINDER: -0.75
OD_OVR_VA: 20/
OD_ADD: +2.25
OD_VPRISM_DIRECTION: PROGS
OS_CYLINDER: -0.75
OS_SPHERE: -2.00
OD_SPHERE: --2.75

## 2025-07-29 ASSESSMENT — REFRACTION_AUTOREFRACTION
OS_AXIS: 082
OD_AXIS: 098
OS_CYLINDER: -2.00
OD_CYLINDER: -2.25
OD_SPHERE: -2.25
OS_SPHERE: -1.25

## 2025-07-29 ASSESSMENT — CONFRONTATIONAL VISUAL FIELD TEST (CVF)
OS_FINDINGS: FULL
OD_FINDINGS: FULL

## 2025-07-29 ASSESSMENT — VISUAL ACUITY
OD_BCVA: 20/25
OS_BCVA: 20/40

## 2025-08-04 ENCOUNTER — TRANSCRIPTION ENCOUNTER (OUTPATIENT)
Age: 80
End: 2025-08-04

## 2025-08-16 ENCOUNTER — NON-APPOINTMENT (OUTPATIENT)
Age: 80
End: 2025-08-16

## 2025-08-18 ENCOUNTER — APPOINTMENT (OUTPATIENT)
Dept: UROLOGY | Facility: CLINIC | Age: 80
End: 2025-08-18
Payer: MEDICARE

## 2025-08-18 VITALS
DIASTOLIC BLOOD PRESSURE: 71 MMHG | HEART RATE: 68 BPM | WEIGHT: 156 LBS | TEMPERATURE: 97.3 F | BODY MASS INDEX: 22.33 KG/M2 | HEIGHT: 70 IN | SYSTOLIC BLOOD PRESSURE: 131 MMHG | OXYGEN SATURATION: 97 %

## 2025-08-18 DIAGNOSIS — R97.20 ELEVATED PROSTATE, SPECIFIC ANTIGEN [PSA]: ICD-10-CM

## 2025-08-18 DIAGNOSIS — N40.1 BENIGN PROSTATIC HYPERPLASIA WITH LOWER URINARY TRACT SYMPMS: ICD-10-CM

## 2025-08-18 DIAGNOSIS — N13.8 BENIGN PROSTATIC HYPERPLASIA WITH LOWER URINARY TRACT SYMPMS: ICD-10-CM

## 2025-08-18 PROCEDURE — 99214 OFFICE O/P EST MOD 30 MIN: CPT

## 2025-08-18 RX ORDER — MAGNESIUM OXIDE/MAG AA CHELATE 300 MG
CAPSULE ORAL
Refills: 0 | Status: ACTIVE | COMMUNITY

## 2025-08-18 RX ORDER — TACROLIMUS 0.5 MG/1
0.5 CAPSULE ORAL
Refills: 0 | Status: ACTIVE | COMMUNITY

## 2025-08-18 RX ORDER — POSACONAZOLE 300 MG/1
300 POWDER, FOR SUSPENSION ORAL
Refills: 0 | Status: ACTIVE | COMMUNITY

## 2025-08-18 RX ORDER — SULFAMETHOXAZOLE AND TRIMETHOPRIM 800; 160 MG/1; MG/1
TABLET ORAL
Refills: 0 | Status: ACTIVE | COMMUNITY

## 2025-08-18 RX ORDER — ACYCLOVIR 400 MG/1
400 TABLET ORAL
Refills: 0 | Status: ACTIVE | COMMUNITY